# Patient Record
Sex: FEMALE | Race: BLACK OR AFRICAN AMERICAN | Employment: UNEMPLOYED
[De-identification: names, ages, dates, MRNs, and addresses within clinical notes are randomized per-mention and may not be internally consistent; named-entity substitution may affect disease eponyms.]

---

## 2023-09-12 ENCOUNTER — HOSPITAL ENCOUNTER (EMERGENCY)
Facility: HOSPITAL | Age: 58
Discharge: HOME | End: 2023-09-12
Attending: EMERGENCY MEDICINE
Payer: MEDICARE

## 2023-09-12 VITALS
SYSTOLIC BLOOD PRESSURE: 153 MMHG | OXYGEN SATURATION: 98 % | WEIGHT: 290 LBS | TEMPERATURE: 97.8 F | RESPIRATION RATE: 16 BRPM | HEIGHT: 66 IN | DIASTOLIC BLOOD PRESSURE: 90 MMHG | HEART RATE: 87 BPM | BODY MASS INDEX: 46.61 KG/M2

## 2023-09-12 DIAGNOSIS — J01.00 ACUTE NON-RECURRENT MAXILLARY SINUSITIS: Primary | ICD-10-CM

## 2023-09-12 DIAGNOSIS — Z76.0 ENCOUNTER FOR MEDICATION REFILL: ICD-10-CM

## 2023-09-12 DIAGNOSIS — M54.50 LUMBAR BACK PAIN: ICD-10-CM

## 2023-09-12 PROCEDURE — 6370000000 HC RX 637 (ALT 250 FOR IP): Performed by: EMERGENCY MEDICINE

## 2023-09-12 PROCEDURE — 99284 EMERGENCY DEPT VISIT MOD MDM: CPT

## 2023-09-12 PROCEDURE — 6360000002 HC RX W HCPCS: Performed by: EMERGENCY MEDICINE

## 2023-09-12 PROCEDURE — 96372 THER/PROPH/DIAG INJ SC/IM: CPT

## 2023-09-12 RX ORDER — FLUTICASONE PROPIONATE 50 MCG
2 SPRAY, SUSPENSION (ML) NASAL DAILY
Qty: 16 G | Refills: 0 | Status: SHIPPED | OUTPATIENT
Start: 2023-09-12

## 2023-09-12 RX ORDER — DOXYCYCLINE HYCLATE 100 MG
100 TABLET ORAL 2 TIMES DAILY
Qty: 14 TABLET | Refills: 0 | Status: SHIPPED | OUTPATIENT
Start: 2023-09-12 | End: 2023-09-19

## 2023-09-12 RX ORDER — CELECOXIB 100 MG/1
100 CAPSULE ORAL 2 TIMES DAILY PRN
Qty: 10 CAPSULE | Refills: 0 | Status: SHIPPED | OUTPATIENT
Start: 2023-09-12 | End: 2023-09-17

## 2023-09-12 RX ORDER — DOXYCYCLINE 100 MG/1
100 CAPSULE ORAL
Status: COMPLETED | OUTPATIENT
Start: 2023-09-12 | End: 2023-09-12

## 2023-09-12 RX ORDER — KETOROLAC TROMETHAMINE 30 MG/ML
30 INJECTION, SOLUTION INTRAMUSCULAR; INTRAVENOUS
Status: COMPLETED | OUTPATIENT
Start: 2023-09-12 | End: 2023-09-12

## 2023-09-12 RX ORDER — ECHINACEA PURPUREA EXTRACT 125 MG
1 TABLET ORAL PRN
Qty: 1 EACH | Refills: 0 | Status: SHIPPED | OUTPATIENT
Start: 2023-09-12

## 2023-09-12 RX ORDER — OMEPRAZOLE 40 MG/1
40 CAPSULE, DELAYED RELEASE ORAL
Qty: 30 CAPSULE | Refills: 0 | Status: SHIPPED | OUTPATIENT
Start: 2023-09-12

## 2023-09-12 RX ADMIN — KETOROLAC TROMETHAMINE 30 MG: 30 INJECTION, SOLUTION INTRAMUSCULAR; INTRAVENOUS at 06:50

## 2023-09-12 RX ADMIN — DOXYCYCLINE 100 MG: 100 CAPSULE ORAL at 06:49

## 2023-09-12 ASSESSMENT — PAIN SCALES - GENERAL: PAINLEVEL_OUTOF10: 9

## 2023-09-12 ASSESSMENT — PAIN DESCRIPTION - DESCRIPTORS: DESCRIPTORS: ACHING

## 2023-09-12 ASSESSMENT — LIFESTYLE VARIABLES
HOW OFTEN DO YOU HAVE A DRINK CONTAINING ALCOHOL: NEVER
HOW MANY STANDARD DRINKS CONTAINING ALCOHOL DO YOU HAVE ON A TYPICAL DAY: PATIENT DOES NOT DRINK

## 2023-09-12 ASSESSMENT — PAIN - FUNCTIONAL ASSESSMENT: PAIN_FUNCTIONAL_ASSESSMENT: 0-10

## 2023-09-12 ASSESSMENT — PAIN DESCRIPTION - LOCATION: LOCATION: BREAST

## 2023-09-12 ASSESSMENT — PAIN DESCRIPTION - ORIENTATION: ORIENTATION: RIGHT;LEFT

## 2023-09-12 ASSESSMENT — PAIN DESCRIPTION - PAIN TYPE: TYPE: ACUTE PAIN

## 2023-11-25 ENCOUNTER — APPOINTMENT (OUTPATIENT)
Facility: HOSPITAL | Age: 58
End: 2023-11-25
Payer: MEDICARE

## 2023-11-25 ENCOUNTER — HOSPITAL ENCOUNTER (EMERGENCY)
Facility: HOSPITAL | Age: 58
Discharge: HOME | End: 2023-11-25
Payer: MEDICARE

## 2023-11-25 VITALS
HEIGHT: 66 IN | OXYGEN SATURATION: 100 % | TEMPERATURE: 97.6 F | RESPIRATION RATE: 16 BRPM | WEIGHT: 290 LBS | HEART RATE: 78 BPM | BODY MASS INDEX: 46.61 KG/M2 | DIASTOLIC BLOOD PRESSURE: 110 MMHG | SYSTOLIC BLOOD PRESSURE: 150 MMHG

## 2023-11-25 DIAGNOSIS — M19.072 OSTEOARTHRITIS OF LEFT FOOT, UNSPECIFIED OSTEOARTHRITIS TYPE: Primary | ICD-10-CM

## 2023-11-25 DIAGNOSIS — M51.34 DDD (DEGENERATIVE DISC DISEASE), THORACIC: ICD-10-CM

## 2023-11-25 PROCEDURE — 73630 X-RAY EXAM OF FOOT: CPT

## 2023-11-25 PROCEDURE — 72072 X-RAY EXAM THORAC SPINE 3VWS: CPT

## 2023-11-25 PROCEDURE — 99283 EMERGENCY DEPT VISIT LOW MDM: CPT

## 2023-11-25 RX ORDER — METHOCARBAMOL 750 MG/1
750 TABLET, FILM COATED ORAL 4 TIMES DAILY
Qty: 40 TABLET | Refills: 0 | Status: SHIPPED | OUTPATIENT
Start: 2023-11-25 | End: 2023-12-05

## 2023-11-25 RX ORDER — ACETAMINOPHEN 500 MG
1000 TABLET ORAL 4 TIMES DAILY PRN
Qty: 30 TABLET | Refills: 1 | Status: SHIPPED | OUTPATIENT
Start: 2023-11-25

## 2023-11-25 ASSESSMENT — PAIN - FUNCTIONAL ASSESSMENT: PAIN_FUNCTIONAL_ASSESSMENT: 0-10

## 2023-11-25 ASSESSMENT — PAIN DESCRIPTION - LOCATION: LOCATION: BACK

## 2023-11-25 ASSESSMENT — ENCOUNTER SYMPTOMS: BACK PAIN: 1

## 2023-11-25 NOTE — ED PROVIDER NOTES
[facility] EMERGENCY DEPT  EMERGENCY DEPARTMENT ENCOUNTER      Pt Name: Shoaib Anton  MRN: [id number]  [address] [facility] 1965  Date of evaluation: 11/25/2023  Provider: Milvia Barrera       Chief Complaint   Patient presents with    Back Pain    Foot Pain         HISTORY OF PRESENT ILLNESS   (Location/Symptom, Timing/Onset, Context/Setting, Quality, Duration, Modifying Factors, Severity)  Note limiting factors. Shoaib Anton is a 62 y.o. female who presents to the emergency department with a complaint of upper lower back pain as well as left foot pain. She has had all of her symptoms probably about a week. She is on her feet all day and has had surgery by Ortho podiatry to remove some of the arthritis in her left foot. .  Denies any trauma or numbness or weakness. Feels more swollen to her in appearance. Denies any redness or history of gout. She is also complaining of upper and lower back pain with history of lumbar fusion. Her upper back seems to be bothering her as well. Denies any numbness weakness in her upper extremities or saddle anesthesia bowel incontinence urinary retention fever rash or IVDU or unintentional weight loss in the past 6 months. She has some pain radiating    HPI    Nursing Notes were reviewed. REVIEW OF SYSTEMS    (2-9 systems for level 4, 10 or more for level 5)     Review of Systems   Constitutional:  Negative for fever and unexpected weight change. Genitourinary:  Negative for difficulty urinating. Musculoskeletal:  Positive for arthralgias, back pain, gait problem and joint swelling. Skin:  Negative for rash and wound. Neurological:  Negative for weakness and numbness. Except as noted above the remainder of the review of systems was reviewed and negative.        PAST MEDICAL HISTORY     Past Medical History:   Diagnosis Date    Hypertension          SURGICAL HISTORY       Past Surgical History:   Procedure Laterality Date    BACK SURGERY           CURRENT

## 2023-11-25 NOTE — ED TRIAGE NOTES
Patient c/o back problems has hx of back problems and left foot is swollen and pain.  Has hx of foot surgery due to arthritis

## 2023-12-09 ENCOUNTER — APPOINTMENT (OUTPATIENT)
Facility: HOSPITAL | Age: 58
End: 2023-12-09
Payer: MEDICARE

## 2023-12-09 ENCOUNTER — HOSPITAL ENCOUNTER (EMERGENCY)
Facility: HOSPITAL | Age: 58
Discharge: HOME | End: 2023-12-09
Attending: STUDENT IN AN ORGANIZED HEALTH CARE EDUCATION/TRAINING PROGRAM
Payer: MEDICARE

## 2023-12-09 VITALS
DIASTOLIC BLOOD PRESSURE: 86 MMHG | WEIGHT: 290 LBS | HEART RATE: 83 BPM | BODY MASS INDEX: 46.61 KG/M2 | OXYGEN SATURATION: 94 % | RESPIRATION RATE: 22 BRPM | TEMPERATURE: 98.2 F | SYSTOLIC BLOOD PRESSURE: 132 MMHG | HEIGHT: 66 IN

## 2023-12-09 DIAGNOSIS — M54.2 NECK PAIN: ICD-10-CM
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
## 2023-12-09 DIAGNOSIS — S39.012A LUMBAR STRAIN, INITIAL ENCOUNTER: Primary | ICD-10-CM

## 2023-12-09 DIAGNOSIS — R51.9 NONINTRACTABLE HEADACHE, UNSPECIFIED CHRONICITY PATTERN, UNSPECIFIED HEADACHE TYPE: ICD-10-CM

## 2023-12-09 PROCEDURE — 73521 X-RAY EXAM HIPS BI 2 VIEWS: CPT

## 2023-12-09 PROCEDURE — 72125 CT NECK SPINE W/O DYE: CPT

## 2023-12-09 PROCEDURE — 6370000000 HC RX 637 (ALT 250 FOR IP): Performed by: STUDENT IN AN ORGANIZED HEALTH CARE EDUCATION/TRAINING PROGRAM

## 2023-12-09 PROCEDURE — 72131 CT LUMBAR SPINE W/O DYE: CPT

## 2023-12-09 PROCEDURE — 99284 EMERGENCY DEPT VISIT MOD MDM: CPT

## 2023-12-09 PROCEDURE — 70450 CT HEAD/BRAIN W/O DYE: CPT

## 2023-12-09 PROCEDURE — 73620 X-RAY EXAM OF FOOT: CPT

## 2023-12-09 PROCEDURE — 72128 CT CHEST SPINE W/O DYE: CPT

## 2023-12-09 RX ORDER — MORPHINE SULFATE 15 MG/1
15 TABLET ORAL ONCE
Status: COMPLETED | OUTPATIENT
Start: 2023-12-09 | End: 2023-12-09

## 2023-12-09 RX ORDER — METHOCARBAMOL 500 MG/1
500 TABLET, FILM COATED ORAL 3 TIMES DAILY
Qty: 21 TABLET | Refills: 0 | Status: SHIPPED | OUTPATIENT
Start: 2023-12-09 | End: 2023-12-16

## 2023-12-09 RX ORDER — ACETAMINOPHEN 325 MG/1
650 TABLET ORAL
Status: COMPLETED | OUTPATIENT
Start: 2023-12-09 | End: 2023-12-09

## 2023-12-09 RX ORDER — MORPHINE SULFATE 15 MG/1
15 TABLET, FILM COATED, EXTENDED RELEASE ORAL
Status: DISCONTINUED | OUTPATIENT
Start: 2023-12-09 | End: 2023-12-09

## 2023-12-09 RX ORDER — METHOCARBAMOL 500 MG/1
500 TABLET, FILM COATED ORAL 4 TIMES DAILY
COMMUNITY
End: 2023-12-09

## 2023-12-09 RX ADMIN — MORPHINE SULFATE 15 MG: 15 TABLET ORAL at 06:21

## 2023-12-09 RX ADMIN — ACETAMINOPHEN 650 MG: 325 TABLET ORAL at 05:47

## 2023-12-09 ASSESSMENT — ENCOUNTER SYMPTOMS
ABDOMINAL PAIN: 0
BACK PAIN: 1
CONSTIPATION: 0
NAUSEA: 0
SHORTNESS OF BREATH: 0
DIARRHEA: 0

## 2023-12-09 ASSESSMENT — PAIN DESCRIPTION - LOCATION: LOCATION: BACK;HIP;SHOULDER

## 2023-12-09 ASSESSMENT — PAIN SCALES - GENERAL: PAINLEVEL_OUTOF10: 10

## 2023-12-09 ASSESSMENT — PAIN DESCRIPTION - ORIENTATION: ORIENTATION: RIGHT;LEFT

## 2023-12-09 NOTE — ED NOTES
Paperwork read with patient making note of where to  prescriptions        Armband removed and disposed of in shredder. Patient has no further questions at this time. Will discharge from system.         Maggi Cowan RN  12/09/23 1500
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
## 2023-12-09 NOTE — DISCHARGE INSTRUCTIONS
Please take the Robaxin for muscle relaxant, take Tylenol around-the-clock. You may take up to 1000 mg every 8 hours for your pain. You have a new worsening of the symptoms or trouble urinating or numbness or weakness in your lower extremity please return to the ER.

## 2023-12-09 NOTE — ED NOTES
Report given via [facility] and care transferred to [facility], [address], [facility], [address] [place] [address]  12/09/23 9323
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
## 2024-03-01 ENCOUNTER — HOSPITAL ENCOUNTER (EMERGENCY)
Facility: HOSPITAL | Age: 59
Discharge: HOME | End: 2024-03-01
Payer: MEDICARE

## 2024-03-01 VITALS
RESPIRATION RATE: 18 BRPM | SYSTOLIC BLOOD PRESSURE: 152 MMHG | WEIGHT: 280 LBS | HEART RATE: 79 BPM | BODY MASS INDEX: 45 KG/M2 | OXYGEN SATURATION: 98 % | DIASTOLIC BLOOD PRESSURE: 94 MMHG | HEIGHT: 66 IN | TEMPERATURE: 97.5 F

## 2024-03-01 DIAGNOSIS — M54.12 CERVICAL RADICULOPATHY: Primary | ICD-10-CM

## 2024-03-01 DIAGNOSIS — S16.1XXA STRAIN OF NECK MUSCLE, INITIAL ENCOUNTER: ICD-10-CM

## 2024-03-01 PROCEDURE — 6370000000 HC RX 637 (ALT 250 FOR IP)

## 2024-03-01 PROCEDURE — 99283 EMERGENCY DEPT VISIT LOW MDM: CPT

## 2024-03-01 RX ORDER — METHYLPREDNISOLONE 4 MG/1
TABLET ORAL
Qty: 1 KIT | Refills: 0 | Status: SHIPPED | OUTPATIENT
Start: 2024-03-01 | End: 2024-03-07

## 2024-03-01 RX ORDER — LIDOCAINE 50 MG/G
1 PATCH TOPICAL DAILY
Qty: 7 PATCH | Refills: 0 | Status: SHIPPED | OUTPATIENT
Start: 2024-03-01 | End: 2024-03-08

## 2024-03-01 RX ADMIN — PREDNISONE 50 MG: 20 TABLET ORAL at 17:28

## 2024-03-01 ASSESSMENT — ENCOUNTER SYMPTOMS
VOMITING: 0
SHORTNESS OF BREATH: 0
CONSTIPATION: 0
CHEST TIGHTNESS: 0
COUGH: 0
DIARRHEA: 0
ABDOMINAL PAIN: 0
NAUSEA: 0
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
## 2024-03-01 NOTE — DISCHARGE INSTRUCTIONS
Begin taking Medrol Dosepak as prescribed.  Place Lidoderm patch to the affected area 12 hours on 12 hours off.  Take Tylenol as needed for additional pain.  Alternate ice and heat to decrease pain and swelling.  Follow-up with both pain management and your orthopedic spine doctor in order to be reevaluated.  Return to the ED with any new or worsening symptoms.

## 2024-03-01 NOTE — ED NOTES
Paperwork read with patient making note of where to  prescriptions.     Armband removed and disposed of in shredder.     Patient has no further questions at this time.     Will discharge from system.

## 2024-03-07 ENCOUNTER — APPOINTMENT (OUTPATIENT)
Facility: HOSPITAL | Age: 59
End: 2024-03-07
Payer: MEDICARE

## 2024-03-07 ENCOUNTER — HOSPITAL ENCOUNTER (EMERGENCY)
Facility: HOSPITAL | Age: 59
Discharge: HOME | End: 2024-03-07
Attending: EMERGENCY MEDICINE
Payer: MEDICARE

## 2024-03-07 VITALS
TEMPERATURE: 98.5 F | SYSTOLIC BLOOD PRESSURE: 133 MMHG | BODY MASS INDEX: 45 KG/M2 | HEART RATE: 74 BPM | DIASTOLIC BLOOD PRESSURE: 93 MMHG | RESPIRATION RATE: 18 BRPM | OXYGEN SATURATION: 96 % | HEIGHT: 66 IN | WEIGHT: 280 LBS

## 2024-03-07 DIAGNOSIS — J40 BRONCHITIS: Primary | ICD-10-CM

## 2024-03-07 LAB
FLUAV RNA SPEC QL NAA+PROBE: NOT DETECTED
FLUBV RNA SPEC QL NAA+PROBE: NOT DETECTED
SARS-COV-2 RNA RESP QL NAA+PROBE: NOT DETECTED

## 2024-03-07 PROCEDURE — 87636 SARSCOV2 & INF A&B AMP PRB: CPT

## 2024-03-07 PROCEDURE — 99284 EMERGENCY DEPT VISIT MOD MDM: CPT

## 2024-03-07 PROCEDURE — 71046 X-RAY EXAM CHEST 2 VIEWS: CPT

## 2024-03-07 NOTE — ED PROVIDER NOTES
data to display         None    Critical Care: None    Diagnosis and Disposition   DISPOSITION Decision To Discharge 03/07/2024 06:04:18 AM    DISCHARGE NOTE:  Basilia Berg's  results have been reviewed with her.  She has been counseled regarding her diagnosis, treatment, and plan.  She verbally conveys understanding and agreement of the signs, symptoms, diagnosis, treatment and prognosis and additionally agrees to follow up as discussed.  She also agrees with the care-plan and conveys that all of her questions have been answered.  I have also provided discharge instructions for her that include: educational information regarding their diagnosis and treatment, and list of reasons why they would want to return to the ED prior to their follow-up appointment, should her condition change. She has been provided with education for proper emergency department utilization.     CLINICAL IMPRESSION:  1. Bronchitis        PLAN:  D/C Home    DISCHARGE MEDICATIONS:  Current Discharge Medication List             Details   methylPREDNISolone (MEDROL, OMA,) 4 MG tablet Take by mouth.  Qty: 1 kit, Refills: 0      lidocaine (LIDODERM) 5 % Place 1 patch onto the skin daily for 7 days 12 hours on, 12 hours off.  Qty: 7 patch, Refills: 0      acetaminophen (TYLENOL) 500 MG tablet Take 2 tablets by mouth 4 times daily as needed for Pain  Qty: 30 tablet, Refills: 1      fluticasone (FLONASE) 50 MCG/ACT nasal spray 2 sprays by Each Nostril route daily  Qty: 16 g, Refills: 0      sodium chloride (OCEAN NASAL SPRAY) 0.65 % nasal spray 1 spray by Nasal route as needed for Congestion  Qty: 1 each, Refills: 0      omeprazole (PRILOSEC) 40 MG delayed release capsule Take 1 capsule by mouth every morning (before breakfast)  Qty: 30 capsule, Refills: 0      celecoxib (CELEBREX) 100 MG capsule Take 1 capsule by mouth 2 times daily as needed for Pain  Qty: 10 capsule, Refills: 0             DISCONTINUED MEDICATIONS:  Current Discharge Medication

## 2024-03-07 NOTE — ED TRIAGE NOTES
Patient presents with c/o cough and congestion. Patient states she has tried OTC medications but the cough will not go away.

## 2024-05-11 ENCOUNTER — HOSPITAL ENCOUNTER (EMERGENCY)
Facility: HOSPITAL | Age: 59
Discharge: HOME | End: 2024-05-11
Attending: EMERGENCY MEDICINE
Payer: MEDICARE

## 2024-05-11 VITALS
WEIGHT: 284 LBS | DIASTOLIC BLOOD PRESSURE: 95 MMHG | RESPIRATION RATE: 18 BRPM | SYSTOLIC BLOOD PRESSURE: 151 MMHG | BODY MASS INDEX: 45.64 KG/M2 | HEART RATE: 83 BPM | OXYGEN SATURATION: 98 % | HEIGHT: 66 IN

## 2024-05-11 DIAGNOSIS — N64.4 BREAST PAIN: Primary | ICD-10-CM

## 2024-05-11 PROCEDURE — 99282 EMERGENCY DEPT VISIT SF MDM: CPT

## 2024-05-11 NOTE — ED PROVIDER NOTES
worsen      Dragon Disclaimer     Please note that this dictation was completed with [employer], the computer voice recognition software. Quite often unanticipated grammatical, syntax, homophones, and other interpretive errors are inadvertently transcribed by the computer software. Please disregard these errors. Please excuse any errors that have escaped final proofreading.      I Gerry Odom MD am the primary clinician of record.  Gerry Odom MD  (Electronically signed)            Gerry Odom MD  05/11/24 1032

## 2024-09-20 ENCOUNTER — HOSPITAL ENCOUNTER (EMERGENCY)
Facility: HOSPITAL | Age: 59
Discharge: HOME | End: 2024-09-20

## 2024-09-20 VITALS
TEMPERATURE: 98.6 F | SYSTOLIC BLOOD PRESSURE: 137 MMHG | WEIGHT: 293 LBS | HEIGHT: 66 IN | BODY MASS INDEX: 47.09 KG/M2 | RESPIRATION RATE: 18 BRPM | OXYGEN SATURATION: 98 % | DIASTOLIC BLOOD PRESSURE: 65 MMHG | HEART RATE: 85 BPM

## 2024-09-20 DIAGNOSIS — J02.0 STREP PHARYNGITIS: Primary | ICD-10-CM

## 2024-09-20 LAB
FLUAV RNA SPEC QL NAA+PROBE: NOT DETECTED
FLUBV RNA SPEC QL NAA+PROBE: NOT DETECTED
S PYO AG THROAT QL: POSITIVE
SARS-COV-2 RNA RESP QL NAA+PROBE: NOT DETECTED
SOURCE: NORMAL

## 2024-09-20 PROCEDURE — 87880 STREP A ASSAY W/OPTIC: CPT

## 2024-09-20 PROCEDURE — 99283 EMERGENCY DEPT VISIT LOW MDM: CPT

## 2024-09-20 PROCEDURE — 87636 SARSCOV2 & INF A&B AMP PRB: CPT

## 2024-09-20 RX ORDER — AMOXICILLIN 500 MG/1
500 CAPSULE ORAL 2 TIMES DAILY
Qty: 20 CAPSULE | Refills: 0 | Status: SHIPPED | OUTPATIENT
Start: 2024-09-20 | End: 2024-09-30

## 2024-09-20 ASSESSMENT — PAIN - FUNCTIONAL ASSESSMENT: PAIN_FUNCTIONAL_ASSESSMENT: 0-10

## 2024-09-20 ASSESSMENT — PAIN SCALES - GENERAL: PAINLEVEL_OUTOF10: 10

## 2025-01-17 ENCOUNTER — HOSPITAL ENCOUNTER (EMERGENCY)
Facility: HOSPITAL | Age: 60
Discharge: HOME | End: 2025-01-17

## 2025-01-17 ENCOUNTER — APPOINTMENT (OUTPATIENT)
Facility: HOSPITAL | Age: 60
End: 2025-01-17

## 2025-01-17 VITALS
HEIGHT: 66 IN | WEIGHT: 280 LBS | RESPIRATION RATE: 16 BRPM | BODY MASS INDEX: 45 KG/M2 | SYSTOLIC BLOOD PRESSURE: 116 MMHG | TEMPERATURE: 99.1 F | HEART RATE: 87 BPM | OXYGEN SATURATION: 100 % | DIASTOLIC BLOOD PRESSURE: 81 MMHG

## 2025-01-17 DIAGNOSIS — J11.1 INFLUENZA-LIKE ILLNESS: ICD-10-CM

## 2025-01-17 DIAGNOSIS — J40 BRONCHITIS: Primary | ICD-10-CM

## 2025-01-17 PROCEDURE — 99283 EMERGENCY DEPT VISIT LOW MDM: CPT

## 2025-01-17 PROCEDURE — 71046 X-RAY EXAM CHEST 2 VIEWS: CPT

## 2025-01-17 RX ORDER — BENZONATATE 100 MG/1
200 CAPSULE ORAL 3 TIMES DAILY PRN
Qty: 30 CAPSULE | Refills: 0 | Status: SHIPPED | OUTPATIENT
Start: 2025-01-17 | End: 2025-01-27

## 2025-01-17 RX ORDER — ALBUTEROL SULFATE 90 UG/1
2 INHALANT RESPIRATORY (INHALATION) 4 TIMES DAILY PRN
Qty: 18 G | Refills: 0 | Status: SHIPPED | OUTPATIENT
Start: 2025-01-17

## 2025-01-17 ASSESSMENT — PAIN SCALES - GENERAL: PAINLEVEL_OUTOF10: 9

## 2025-01-17 ASSESSMENT — PAIN - FUNCTIONAL ASSESSMENT: PAIN_FUNCTIONAL_ASSESSMENT: 0-10

## 2025-01-17 NOTE — ED PROVIDER NOTES
EMERGENCY DEPARTMENT HISTORY & PHYSICAL EXAM    1/17/25, 11:10 AM EST    Clinical Impression:  1. Bronchitis    2. Influenza-like illness        Assessment/Differential Diagnosis:     Ddx COVID, flu, viral infection, bacterial infection, bronchitis, pneumonia considered    ED Course:   Initial assessment performed. The patients presenting problems have been discussed, and they are in agreement with the care plan formulated and outlined with them.  I have encouraged them to ask questions as they arise throughout their visit.    Patient comes the ED with 2 to 3 days of myalgia, mild headache, rhinorrhea, and cough.  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  Unsure of any specific diagnosis.  She denies any shortness of breath, chest pain, abdominal symptoms.  No rash or neck stiffness.  Patient does take medication for hypertension and bipolar disease.  Immunocompetent.    Exam with adult female sitting up on stretcher.  She appears comfortable no acute distress.  Vitals with no acute concern.  TMs normal, throat mild erythema, nose with clear congestion, neck is supple without adenopathy or meningismus.  Heart regular rate and rhythm without murmur, lungs clear to auscultation.  No cough on exam.  Abdomen soft and nontender.  No rash    Chest x-ray with no acute process  Discussed with patient likely influenza-like illness, given increase incidence of influenza A at this time.  Discussed symptomatic care, contagious precautions and return precautions    Medical Chart Review:  I have reviewed triage nursing documentation.      Disposition:  Home  in stable condition.      Chief Complaint   Patient presents with    Cough    Nasal Congestion    Ear Fullness     HPI:    The history is provided by patient. No  used.    Basilia Berg is a 59 y.o. female presenting to the Emergency Department with complaints of

## 2025-01-17 NOTE — DISCHARGE INSTRUCTIONS
Your x-ray shows no pneumonia  Symptoms are suggestive of viral illness, bronchitis  Stay well-hydrated  Healthy diet  Take your home medications  Can try guaifenesin  Cool-mist humidifier at night  Tessalon Perles were prescribed for cough  Can also try albuterol inhaler for tight cough or wheeze  Follow-up with your doctor next week if continued symptoms, return to ER if any new or worsening symptoms

## 2025-01-17 NOTE — ED TRIAGE NOTES
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 Reports she has a cough congestion, and ear feels fullness and bodyaches

## 2025-02-07 ENCOUNTER — HOSPITAL ENCOUNTER (EMERGENCY)
Facility: HOSPITAL | Age: 60
Discharge: HOME | End: 2025-02-07

## 2025-02-07 VITALS
DIASTOLIC BLOOD PRESSURE: 90 MMHG | HEIGHT: 66 IN | OXYGEN SATURATION: 100 % | HEART RATE: 74 BPM | TEMPERATURE: 98.2 F | BODY MASS INDEX: 45 KG/M2 | WEIGHT: 280 LBS | RESPIRATION RATE: 16 BRPM | SYSTOLIC BLOOD PRESSURE: 150 MMHG

## 2025-02-07 DIAGNOSIS — L73.9 FOLLICULITIS: Primary | ICD-10-CM

## 2025-02-07 PROCEDURE — 99283 EMERGENCY DEPT VISIT LOW MDM: CPT

## 2025-02-07 RX ORDER — HYDROXYZINE HYDROCHLORIDE 25 MG/1
25 TABLET, FILM COATED ORAL NIGHTLY PRN
Qty: 15 TABLET | Refills: 0 | Status: SHIPPED | OUTPATIENT
Start: 2025-02-07 | End: 2025-02-17

## 2025-02-07 RX ORDER — DOXYCYCLINE HYCLATE 100 MG
100 TABLET ORAL 2 TIMES DAILY
Qty: 14 TABLET | Refills: 0 | Status: SHIPPED | OUTPATIENT
Start: 2025-02-07 | End: 2025-02-14

## 2025-02-07 ASSESSMENT — PAIN DESCRIPTION - PAIN TYPE: TYPE: CHRONIC PAIN

## 2025-02-07 ASSESSMENT — PAIN DESCRIPTION - LOCATION: LOCATION: LEG;BUTTOCKS

## 2025-02-07 ASSESSMENT — PAIN DESCRIPTION - ORIENTATION: ORIENTATION: RIGHT;LEFT

## 2025-02-07 ASSESSMENT — PAIN - FUNCTIONAL ASSESSMENT: PAIN_FUNCTIONAL_ASSESSMENT: 0-10

## 2025-02-07 ASSESSMENT — PAIN DESCRIPTION - FREQUENCY: FREQUENCY: INTERMITTENT

## 2025-02-07 ASSESSMENT — PAIN SCALES - GENERAL: PAINLEVEL_OUTOF10: 7

## 2025-02-07 NOTE — ED PROVIDER NOTES
EMERGENCY DEPARTMENT HISTORY & PHYSICAL EXAM    2/7/25, 6:11 PM EST    Clinical Impression:  1. Folliculitis        Assessment/Differential Diagnosis:     Ddx dermatitis, allergic reaction, eczema, atopic dermatitis, infectious process, bedbugs, scabies.    ED Course:   Initial assessment performed. The patients presenting problems have been discussed, and they are in agreement with the care plan formulated and outlined with them.  I have encouraged them to ask questions as they arise throughout their visit.    Patient comes the ED with evolving rash.  Patient states about 2 months ago she noticed a few small raised bumps in the back of her thighs.  They were slightly pruritic and she scratched them.  She noticed since that time she has had evolving similar rash to the back of her thighs, abdomen, forearms and buttocks area.  She feels a started as a raised bumps that she then scratches.  They sometimes will scab over.  There is been no pain.  She denies any pustules or vesicles.  No linear pattern.  No others around her with similar rash.  There is been no fever, chills or flulike illness.  Other than rash she has been feeling her normal.  No new topicals or exposures.  No previous similar rash.    Exam with obese middle-aged female sitting in exam chair.  She appears comfortable no acute distress.  Vitals with elevated blood pressure.  Heart regular rate and rhythm without murmur, lungs clear to auscultation, abdomen soft and nontender with good bowel sounds.  Examination of her skin reveals excoriated discrete papules, no linear pattern noticed to her buttocks, posterior thighs, a few on her forearms.  No linear pattern.  No finger webs.  No eczema.    Discussed with patient we will treat as possible for folliculitis with doxycycline, but she will need follow-up with primary care or dermatology to ensure complete resolution and to ensure no further workup or treatment is

## 2025-02-07 NOTE — DISCHARGE INSTRUCTIONS
Antibacterial soap,  Cool, quick, showers  Aquaphor or Vaseline daily  Atarax to help with itch  Antibiotic as prescribed, take with food.  Remain upright for 30 minutes after you take the antibiotic so does not cause GI distress  Follow-up with your doctor next week  Information for dermatology was given as well, call their office Monday  Return to ER if any new or worsening symptoms or new concerns

## 2025-03-03 ENCOUNTER — APPOINTMENT (OUTPATIENT)
Facility: HOSPITAL | Age: 60
End: 2025-03-03
Payer: COMMERCIAL

## 2025-03-03 ENCOUNTER — HOSPITAL ENCOUNTER (EMERGENCY)
Facility: HOSPITAL | Age: 60
Discharge: HOME | End: 2025-03-03
Payer: COMMERCIAL

## 2025-03-03 VITALS
HEIGHT: 66 IN | RESPIRATION RATE: 12 BRPM | DIASTOLIC BLOOD PRESSURE: 94 MMHG | WEIGHT: 280 LBS | HEART RATE: 75 BPM | TEMPERATURE: 98.4 F | SYSTOLIC BLOOD PRESSURE: 184 MMHG | OXYGEN SATURATION: 100 % | BODY MASS INDEX: 45 KG/M2

## 2025-03-03 DIAGNOSIS — M75.21 BICEPS TENDONITIS, RIGHT: Primary | ICD-10-CM

## 2025-03-03 LAB
D DIMER PPP FEU-MCNC: 2.3 UG/ML(FEU)
ECHO BSA: 2.43 M2

## 2025-03-03 PROCEDURE — 73030 X-RAY EXAM OF SHOULDER: CPT

## 2025-03-03 PROCEDURE — 93971 EXTREMITY STUDY: CPT

## 2025-03-03 PROCEDURE — 99284 EMERGENCY DEPT VISIT MOD MDM: CPT

## 2025-03-03 PROCEDURE — 85379 FIBRIN DEGRADATION QUANT: CPT

## 2025-03-03 RX ORDER — HYDROCODONE BITARTRATE AND ACETAMINOPHEN 5; 325 MG/1; MG/1
1 TABLET ORAL EVERY 6 HOURS PRN
Qty: 10 TABLET | Refills: 0 | Status: SHIPPED | OUTPATIENT
Start: 2025-03-03 | End: 2025-03-06

## 2025-03-03 RX ORDER — METHOCARBAMOL 750 MG/1
750 TABLET, FILM COATED ORAL 4 TIMES DAILY
Qty: 20 TABLET | Refills: 0 | Status: SHIPPED | OUTPATIENT
Start: 2025-03-03 | End: 2025-03-08

## 2025-03-03 NOTE — ED TRIAGE NOTES
Pt ambulated into er for complaints of right arm pain in the bicep area that radiates up to her shoulder x 1 week. Pt denies any injury, states pain Is worse with movement.

## 2025-03-03 NOTE — ED PROVIDER NOTES
[facility]  EMERGENCY DEPARTMENT ENCOUNTER       Pt Name: Basilia Berg  MRN: [id number]  Birthdate 1965  Date of evaluation: 3/3/2025  PCP: Marychuy, Pcp  Note Started: 11:52 PM 3/3/25     CHIEF COMPLAINT       Chief Complaint   Patient presents with    Arm Pain        HISTORY OF PRESENT ILLNESS: 1 or more elements      History From: Patient  HPI Limitations: None  Chronic Conditions: HTN  Social Determinants affecting Dx or Tx: none      Basilia Berg is a 59 y.o. female who presents to ED c/o right upper arm pain. Pain radiates to right shoulder. Worse with movement. No trauma or overuse. No relief with OTC meds. No numbness, weakness, swelling, color change. Pt has no hx of DVT/PE     Nursing Notes were all reviewed and agreed with or any disagreements were addressed in the HPI.    PAST HISTORY     Past Medical History:  Past Medical History:   Diagnosis Date    Hypertension        Past Surgical History:  Past Surgical History:   Procedure Laterality Date    BACK SURGERY         Family History:  No family history on file.    Social History:  Social History     Socioeconomic History    Marital status: Single   Tobacco Use    Smoking status: Never    Smokeless tobacco: Never   Substance and Sexual Activity    Alcohol use: Not Currently    Drug use: Never       Allergies:  Allergies   Allergen Reactions    Lisinopril Anaphylaxis       CURRENT MEDICATIONS      No current facility-administered medications for this encounter.     Current Outpatient Medications   Medication Sig Dispense Refill    HYDROcodone-acetaminophen (NORCO) 5-325 MG per tablet Take 1 tablet by mouth every 6 hours as needed for Pain for up to 3 days. Max Daily Amount: 4 tablets 10 tablet 0    methocarbamol (ROBAXIN-750) 750 MG tablet Take 1 tablet by mouth 4 times daily for 5 days As needed for muscle spasms 20 tablet 0    albuterol sulfate HFA (VENTOLIN HFA) 108 (90 Base) MCG/ACT inhaler Inhale 2 puffs into the lungs 4